# Patient Record
Sex: MALE | Race: WHITE | NOT HISPANIC OR LATINO | URBAN - METROPOLITAN AREA
[De-identification: names, ages, dates, MRNs, and addresses within clinical notes are randomized per-mention and may not be internally consistent; named-entity substitution may affect disease eponyms.]

---

## 2023-02-13 ENCOUNTER — TELEPHONE ENCOUNTER (OUTPATIENT)
Dept: URBAN - METROPOLITAN AREA CLINIC 35 | Facility: CLINIC | Age: 58
End: 2023-02-13

## 2024-02-06 ENCOUNTER — LAB (OUTPATIENT)
Dept: URBAN - METROPOLITAN AREA CLINIC 98 | Facility: CLINIC | Age: 59
End: 2024-02-06

## 2024-02-06 ENCOUNTER — OV NP (OUTPATIENT)
Dept: URBAN - METROPOLITAN AREA CLINIC 98 | Facility: CLINIC | Age: 59
End: 2024-02-06
Payer: COMMERCIAL

## 2024-02-06 VITALS
SYSTOLIC BLOOD PRESSURE: 141 MMHG | BODY MASS INDEX: 28.68 KG/M2 | HEIGHT: 73 IN | WEIGHT: 216.4 LBS | TEMPERATURE: 97.2 F | HEART RATE: 62 BPM | DIASTOLIC BLOOD PRESSURE: 83 MMHG

## 2024-02-06 DIAGNOSIS — K92.1 MELENA: ICD-10-CM

## 2024-02-06 DIAGNOSIS — Z12.11 SCREENING FOR COLON CANCER: ICD-10-CM

## 2024-02-06 DIAGNOSIS — R10.10 PAIN OF UPPER ABDOMEN: ICD-10-CM

## 2024-02-06 PROCEDURE — 99204 OFFICE O/P NEW MOD 45 MIN: CPT

## 2024-02-06 RX ORDER — MULTIVIT-MIN/IRON/FOLIC ACID/K 18-600-40
CAPSULE ORAL
Status: ON HOLD | COMMUNITY

## 2024-02-06 RX ORDER — VITAMIN D 25 MCG
1 TABLET TAB ORAL ONCE A DAY
Status: ON HOLD | COMMUNITY

## 2024-02-06 RX ORDER — MULTIVITAMIN
CAPSULE ORAL
Status: ON HOLD | COMMUNITY

## 2024-02-06 RX ORDER — OMEPRAZOLE 40 MG/1
1 CAPSULE 30 MINUTES BEFORE MORNING MEAL CAPSULE, DELAYED RELEASE ORAL ONCE A DAY
Qty: 60 | Refills: 0 | OUTPATIENT
Start: 2024-02-06

## 2024-02-06 RX ORDER — LISINOPRIL 20 MG/1
1 TABLET TABLET ORAL ONCE A DAY
Status: ON HOLD | COMMUNITY

## 2024-02-06 RX ORDER — AZELASTINE HYDROCHLORIDE 137 UG/1
1 PUFF IN EACH NOSTRIL SPRAY, METERED NASAL TWICE A DAY
Status: ON HOLD | COMMUNITY

## 2024-02-06 RX ORDER — MONTELUKAST SODIUM 10 MG/1
1 TABLET IN THE EVENING TABLET, FILM COATED ORAL AS NEEDED
Status: ON HOLD | COMMUNITY

## 2024-02-06 RX ORDER — OMEPRAZOLE MAGNESIUM 20 MG/1
1 CAPSULE CAPSULE, DELAYED RELEASE ORAL ONCE A DAY
Qty: 30 | Refills: 1 | Status: ON HOLD | COMMUNITY
Start: 2015-04-14

## 2024-02-06 RX ORDER — B-COMPLEX WITH VITAMIN C
1 TABLET TABLET ORAL ONCE A DAY
Status: ON HOLD | COMMUNITY

## 2024-02-06 RX ORDER — SODIUM SULFATE, POTASSIUM SULFATE, MAGNESIUM SULFATE 17.5; 3.13; 1.6 G/ML; G/ML; G/ML
AS DIRECTED SOLUTION, CONCENTRATE ORAL 1
Qty: 1 | Status: ON HOLD | COMMUNITY
Start: 2015-04-14

## 2024-02-06 RX ORDER — FLUTICASONE PROPIONATE 50 UG/1
1 SPRAY IN EACH NOSTRIL SPRAY, METERED NASAL ONCE A DAY
Status: ACTIVE | COMMUNITY

## 2024-02-06 RX ORDER — OMEPRAZOLE 20 MG/1
1 CAPSULE CAPSULE, DELAYED RELEASE ORAL ONCE A DAY
Qty: 90 | Refills: 0 | Status: ON HOLD | COMMUNITY
Start: 2015-04-15

## 2024-02-06 RX ORDER — SODIUM, POTASSIUM,MAG SULFATES 17.5-3.13G
354ML SOLUTION, RECONSTITUTED, ORAL ORAL
Qty: 354 ML | Refills: 0 | OUTPATIENT
Start: 2024-02-06 | End: 2024-02-07

## 2024-02-06 NOTE — HPI-TODAY'S VISIT:
Mr. Meek is a 58-year-old male patient referred to Dr. Kennedy PCP Dr. Naren Rouse; today's progress note will be sent after visit. - Here with upper abdominal pain, dark stools and wanting to schedule screening colonoscopy - Last colonoscopy 5/12/2015 > unremarkable - Family history gastric cancer- father and grandfather - 2 weeks ago had 2 days of really dark stools and upper abdominal pain - Started on OTC Prilosec daily and made dietary changes. - Discomfort is improving - Abdominal pain is worse when he does not eat. - BM 1-2 times per day - Stools are formed - No bright red blood, or mucus in stools - Denies unintentional weight loss

## 2024-03-15 ENCOUNTER — COL/EGD (OUTPATIENT)
Dept: URBAN - METROPOLITAN AREA LAB 3 | Facility: LAB | Age: 59
End: 2024-03-15
Payer: COMMERCIAL

## 2024-03-15 DIAGNOSIS — Z12.11 COLON CANCER SCREENING: ICD-10-CM

## 2024-03-15 DIAGNOSIS — D12.0 ADENOMA OF CECUM: ICD-10-CM

## 2024-03-15 DIAGNOSIS — K31.A11 INTESTINAL METAPLASIA OF ANTRUM OF STOMACH WITHOUT DYSPLASIA: ICD-10-CM

## 2024-03-15 DIAGNOSIS — K31.89 OTHER DISEASES OF STOMACH AND DUODENUM: ICD-10-CM

## 2024-03-15 DIAGNOSIS — D12.2 ADENOMA OF ASCENDING COLON: ICD-10-CM

## 2024-03-15 DIAGNOSIS — K62.1 ANAL AND RECTAL POLYP: ICD-10-CM

## 2024-03-15 DIAGNOSIS — K29.60 ADENOPAPILLOMATOSIS GASTRICA: ICD-10-CM

## 2024-03-15 PROCEDURE — 45385 COLONOSCOPY W/LESION REMOVAL: CPT | Performed by: INTERNAL MEDICINE

## 2024-03-15 PROCEDURE — 43239 EGD BIOPSY SINGLE/MULTIPLE: CPT | Performed by: INTERNAL MEDICINE

## 2024-03-15 PROCEDURE — 45380 COLONOSCOPY AND BIOPSY: CPT | Performed by: INTERNAL MEDICINE

## 2024-03-15 RX ORDER — MULTIVIT-MIN/IRON/FOLIC ACID/K 18-600-40
CAPSULE ORAL
Status: ON HOLD | COMMUNITY

## 2024-03-15 RX ORDER — SODIUM SULFATE, POTASSIUM SULFATE, MAGNESIUM SULFATE 17.5; 3.13; 1.6 G/ML; G/ML; G/ML
AS DIRECTED SOLUTION, CONCENTRATE ORAL 1
Qty: 1 | Status: ON HOLD | COMMUNITY
Start: 2015-04-14

## 2024-03-15 RX ORDER — LISINOPRIL 20 MG/1
1 TABLET TABLET ORAL ONCE A DAY
Status: ON HOLD | COMMUNITY

## 2024-03-15 RX ORDER — OMEPRAZOLE MAGNESIUM 20 MG/1
1 CAPSULE CAPSULE, DELAYED RELEASE ORAL ONCE A DAY
Qty: 30 | Refills: 1 | Status: ON HOLD | COMMUNITY
Start: 2015-04-14

## 2024-03-15 RX ORDER — VITAMIN D 25 MCG
1 TABLET TAB ORAL ONCE A DAY
Status: ON HOLD | COMMUNITY

## 2024-03-15 RX ORDER — OMEPRAZOLE 20 MG/1
1 CAPSULE CAPSULE, DELAYED RELEASE ORAL ONCE A DAY
Qty: 90 | Refills: 0 | Status: ON HOLD | COMMUNITY
Start: 2015-04-15

## 2024-03-15 RX ORDER — MULTIVITAMIN
CAPSULE ORAL
Status: ON HOLD | COMMUNITY

## 2024-03-15 RX ORDER — AZELASTINE HYDROCHLORIDE 137 UG/1
1 PUFF IN EACH NOSTRIL SPRAY, METERED NASAL TWICE A DAY
Status: ON HOLD | COMMUNITY

## 2024-03-15 RX ORDER — B-COMPLEX WITH VITAMIN C
1 TABLET TABLET ORAL ONCE A DAY
Status: ON HOLD | COMMUNITY

## 2024-03-15 RX ORDER — MONTELUKAST SODIUM 10 MG/1
1 TABLET IN THE EVENING TABLET, FILM COATED ORAL AS NEEDED
Status: ON HOLD | COMMUNITY

## 2024-03-15 RX ORDER — FLUTICASONE PROPIONATE 50 UG/1
1 SPRAY IN EACH NOSTRIL SPRAY, METERED NASAL ONCE A DAY
Status: ACTIVE | COMMUNITY

## 2024-03-15 RX ORDER — OMEPRAZOLE 40 MG/1
1 CAPSULE 30 MINUTES BEFORE MORNING MEAL CAPSULE, DELAYED RELEASE ORAL ONCE A DAY
Qty: 60 | Refills: 0 | Status: ACTIVE | COMMUNITY
Start: 2024-02-06